# Patient Record
Sex: MALE | Race: WHITE | URBAN - METROPOLITAN AREA
[De-identification: names, ages, dates, MRNs, and addresses within clinical notes are randomized per-mention and may not be internally consistent; named-entity substitution may affect disease eponyms.]

---

## 2017-01-02 ENCOUNTER — MYC MEDICAL ADVICE (OUTPATIENT)
Dept: ORTHOPEDICS | Facility: CLINIC | Age: 47
End: 2017-01-02

## 2017-01-02 DIAGNOSIS — M16.51 POST-TRAUMATIC OSTEOARTHRITIS OF RIGHT HIP: Primary | ICD-10-CM

## 2017-01-02 NOTE — TELEPHONE ENCOUNTER
"I talked to Brijesh and he is going to have to travel for business over the next couple months.    He is not having any pain as of now.  He knows that when he has the hip replaced it is going to make his back \"screwed up\" as he is shorter on that side.  He is wondering if there is a wedge or something he can put in his shoe to help adjust his back before the surgery?    I informed him I would like Dr. Hurst know and get back to him this week.  He is fine with this.     I have canceled his surgery and apts.    Jacques/ISHA   "

## 2017-01-02 NOTE — TELEPHONE ENCOUNTER
Per Dr. Natali JEFFERSON Ok to refer to orthotics to get this done.  i have sent Brijesh a message vis barrett Hanna/ISHA